# Patient Record
Sex: MALE | Race: WHITE | NOT HISPANIC OR LATINO | URBAN - METROPOLITAN AREA
[De-identification: names, ages, dates, MRNs, and addresses within clinical notes are randomized per-mention and may not be internally consistent; named-entity substitution may affect disease eponyms.]

---

## 2017-05-21 ENCOUNTER — EMERGENCY (EMERGENCY)
Facility: HOSPITAL | Age: 32
LOS: 1 days | Discharge: HOME | End: 2017-05-21
Attending: EMERGENCY MEDICINE | Admitting: EMERGENCY MEDICINE
Payer: COMMERCIAL

## 2017-05-21 VITALS
OXYGEN SATURATION: 99 % | RESPIRATION RATE: 16 BRPM | WEIGHT: 199.96 LBS | HEART RATE: 88 BPM | HEIGHT: 71 IN | DIASTOLIC BLOOD PRESSURE: 78 MMHG | SYSTOLIC BLOOD PRESSURE: 142 MMHG | TEMPERATURE: 98 F

## 2017-05-21 PROCEDURE — 99283 EMERGENCY DEPT VISIT LOW MDM: CPT

## 2017-05-21 NOTE — ED ADULT TRIAGE NOTE - CHIEF COMPLAINT QUOTE
BIBA after choking on chinese food, " I think I cleared it all, but I still feel some pressure when I try to swallow" No resp difficulty, speaking in full sentences.

## 2017-05-21 NOTE — ED PROVIDER NOTE - MEDICAL DECISION MAKING DETAILS
32 y/o M presents to ED c/o foreign body sensation in throat after choking on food while eating and clearing the food with cough.  Pt well appearing.  No respiratory distress.  Normal resp rate and O2.  Pt swallowing secretions without difficulty.  Given food is no radiolucent, imaging will be of little utility.  Pt advised to drink ginger ale and strict return precautions.  Pt also advised to seek medical care for signs and symptoms of aspiration pneumonia.

## 2017-05-21 NOTE — ED PROVIDER NOTE - OBJECTIVE STATEMENT
32 y/o M presents to ED c/o foreign body sensation after choking on food while eating approx 1 hour ago.  Pt states he was eating Chinese food, chewing beef when he felt the choking sensation.  He states he felt difficulty breathing and did not what to do.   His brother called 911 who advised the patient to cough.  This cleared a small amount of food an mucous.  He immediately felt better but was advised to go to ED for evaluation.  He denies difficulty swallowing, shortness of breath, throat pain, fevers/chills.

## 2017-05-25 DIAGNOSIS — R09.89 OTHER SPECIFIED SYMPTOMS AND SIGNS INVOLVING THE CIRCULATORY AND RESPIRATORY SYSTEMS: ICD-10-CM
